# Patient Record
Sex: MALE | Race: WHITE | NOT HISPANIC OR LATINO | Employment: UNEMPLOYED | URBAN - METROPOLITAN AREA
[De-identification: names, ages, dates, MRNs, and addresses within clinical notes are randomized per-mention and may not be internally consistent; named-entity substitution may affect disease eponyms.]

---
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
## 2023-05-02 DIAGNOSIS — Q15.0 GLAUCOMA OF CHILDHOOD: ICD-10-CM

## 2023-05-02 NOTE — TELEPHONE ENCOUNTER
Mom called requesting [facility] and for a refill of cosopt.

## 2023-05-03 RX ORDER — DORZOLAMIDE HYDROCHLORIDE AND TIMOLOL MALEATE 20; 5 MG/ML; MG/ML
SOLUTION/ DROPS OPHTHALMIC
Qty: 30 ML | Refills: 6 | Status: SHIPPED | OUTPATIENT
Start: 2023-05-03

## 2024-01-23 DIAGNOSIS — Q15.0 GLAUCOMA OF CHILDHOOD: ICD-10-CM

## 2024-01-24 RX ORDER — LATANOPROST 50 UG/ML
SOLUTION/ DROPS OPHTHALMIC
Qty: 7.5 ML | Refills: 4 | Status: SHIPPED | OUTPATIENT
Start: 2024-01-24

## 2024-07-03 ENCOUNTER — PATIENT MESSAGE (OUTPATIENT)
Dept: OPHTHALMOLOGY | Facility: CLINIC | Age: 24
End: 2024-07-03
Payer: COMMERCIAL

## 2024-07-24 ENCOUNTER — TELEPHONE (OUTPATIENT)
Dept: OPHTHALMOLOGY | Facility: CLINIC | Age: 24
End: 2024-07-24
Payer: COMMERCIAL

## 2024-07-24 NOTE — TELEPHONE ENCOUNTER
Spoke with pt's mother. Informed Mom that Dr. Ferreira only see adult strabismus patients. Offered Mom an appointment with glaucoma specialist in [place], she declined for now, stated that she would look in [place] area first.     -Rubia   ----- Message from Dyllan Khalil sent at 7/24/2024 12:54 PM CDT -----  Contact: [phone]  Caller is requesting an appointment.  Name of Caller Ayesha Horan   Symptoms:  Best Call Back Number:[phone]  Additional Information: Pt is a former pt of Dr. Castañeda. His mother is trying to see if he can see Dr. Ferreira in [place]